# Patient Record
Sex: FEMALE
[De-identification: names, ages, dates, MRNs, and addresses within clinical notes are randomized per-mention and may not be internally consistent; named-entity substitution may affect disease eponyms.]

---

## 2023-07-26 PROBLEM — Z00.00 ENCOUNTER FOR PREVENTIVE HEALTH EXAMINATION: Status: ACTIVE | Noted: 2023-07-26

## 2023-07-31 ENCOUNTER — APPOINTMENT (OUTPATIENT)
Dept: ORTHOPEDIC SURGERY | Facility: CLINIC | Age: 73
End: 2023-07-31
Payer: MEDICARE

## 2023-07-31 DIAGNOSIS — M25.512 PAIN IN LEFT SHOULDER: ICD-10-CM

## 2023-07-31 DIAGNOSIS — G89.29 PAIN IN LEFT SHOULDER: ICD-10-CM

## 2023-07-31 PROCEDURE — 99204 OFFICE O/P NEW MOD 45 MIN: CPT

## 2023-08-03 PROBLEM — M25.512 CHRONIC LEFT SHOULDER PAIN: Status: ACTIVE | Noted: 2023-08-03

## 2023-08-03 NOTE — HISTORY OF PRESENT ILLNESS
[de-identified] : 73 year old RHD female present today with left shoulder pain > 7 years. No injury reported. She was seen by her PMD in March 2023, x-rays were done which were negative for fx. She was referred for PT which she completed in May 2023 with some relief. Rahul pain is intermittent brought on with FF and , laying on the arm. She is not taking pain medication. C/o occasional tinling in the upper left arm.   The patient's past medical history, past surgical history, medications and allergies were reviewed by me today with the patient and documented accordingly. In addition, the patient's family and social history, which were noncontributory to this visit, were reviewed also.

## 2023-08-03 NOTE — PHYSICAL EXAM
[de-identified] : Oriented to time, place, person Mood: Normal Affect: Normal Appearance: Healthy, well appearing, no acute distress. Gait: Normal Assistive Devices: None  Left shoulder exam:  Inspection: No malalignment, No defects, No atrophy Skin: No masses, No lesions Neck: Negative Spurling, full ROM, no pain with ROM AROM: FF to 130, abduction to 90, ER to 60, IR to upper lumbar Painful arc ROM: Pain with further motion Tenderness: No bicipital tenderness, + tenderness to greater tuberosity/RTC insertion, no anterior shoulder/lesser tuberosity tenderness Strength: 5/5 ER, 5/5 IR in adduction, 4+/5 supraspinatus testing, negative Donley's test AC joint: No TTP/pain with cross arm testing Biceps: Speed Negative, Yergason Negative  Impingement test: + Wynn, + Neer Vasc: 2+ radial pulse  Stability: Stable  Neuro: AIN, PIN, Ulnar nerve intact to motor Sensation: Intact to light touch throughout  [de-identified] : Images were reviewed from outside source  3 views of left shoulder that show no acute fracture or dislocation. There is no glenohumeral and mild AC joint degenerative change seen. Type II acromion. There is no significant malalignment. No significant other obvious osseous abnormality, otherwise unremarkable.

## 2023-08-03 NOTE — DISCUSSION/SUMMARY
[de-identified] : 74 y/o female with left shoulder pain.   Clinically, patient has positive impingement signs which are chronic in nature. A discussion was had with the patient regarding potential sources of inflammatory shoulder discomfort; including rotator cuff tendon dysfunction (including tendonitis vs. internal structural damage), subdeltoid/subacromial bursitis, or impingement of the rotator cuff at the acromion. Other contributing sources of pain may be the acromioclavicular joint or long head of the biceps tendon. Irritation is typically caused either by overhead repetitive activity or as a result of minor injury.   Discussed short-term and long-term outcomes as well as the goal of treatment to reduce pain and restore function. Nonsurgical treatment is typically first-line therapy that may take weeks to months to resolve symptoms; includes rest from overhead activities, NSAIDs, home exercise program versus physical therapy to restore normal strength/ROM/function of the shoulder, and possible corticosteroid injection. Also discussed the role of arthroscopic surgical intervention when nonsurgical treatment does not adequately relieve pain/inflammation.   Recommendations: Conservative modalities as above (overhead activity rest/activity avoidance until less symptomatic, ice, NSAIDs, home exercise strengthening and stretching program).  Continue PT treatments.  Followup: If symptoms progress or persist for additional treatment as needed

## 2023-08-03 NOTE — ADDENDUM
[FreeTextEntry1] : This note was written by Maria Eugenia Chávez on 07/31/2023 acting solely as a scribe for Dr. Roberto Munguia.  All medical record entries made by the Scribe were at my, Dr. Roberto Munguia, direction and personally dictated by me on 07/31/2023. I have personally reviewed the chart and agree that the record accurately reflects my personal performance of the history, physical exam, assessment and plan.

## 2024-07-25 ENCOUNTER — TRANSCRIPTION ENCOUNTER (OUTPATIENT)
Age: 74
End: 2024-07-25